# Patient Record
Sex: FEMALE | Race: WHITE | HISPANIC OR LATINO | Employment: FULL TIME | URBAN - METROPOLITAN AREA
[De-identification: names, ages, dates, MRNs, and addresses within clinical notes are randomized per-mention and may not be internally consistent; named-entity substitution may affect disease eponyms.]

---

## 2017-03-22 ENCOUNTER — NON-PROVIDER VISIT (OUTPATIENT)
Dept: OBGYN | Facility: CLINIC | Age: 42
End: 2017-03-22
Payer: MEDICAID

## 2017-03-22 DIAGNOSIS — Z32.01 POSITIVE PREGNANCY TEST: ICD-10-CM

## 2017-03-22 LAB
INT CON NEG: NEGATIVE
INT CON POS: POSITIVE
POC URINE PREGNANCY TEST: POSITIVE

## 2017-03-22 PROCEDURE — 81025 URINE PREGNANCY TEST: CPT | Performed by: NURSE PRACTITIONER

## 2017-04-30 ENCOUNTER — HOSPITAL ENCOUNTER (EMERGENCY)
Facility: MEDICAL CENTER | Age: 42
End: 2017-04-30
Attending: EMERGENCY MEDICINE
Payer: MEDICAID

## 2017-04-30 ENCOUNTER — APPOINTMENT (OUTPATIENT)
Dept: RADIOLOGY | Facility: MEDICAL CENTER | Age: 42
End: 2017-04-30
Attending: EMERGENCY MEDICINE
Payer: MEDICAID

## 2017-04-30 VITALS
DIASTOLIC BLOOD PRESSURE: 54 MMHG | HEART RATE: 65 BPM | RESPIRATION RATE: 18 BRPM | TEMPERATURE: 98.3 F | WEIGHT: 152.12 LBS | HEIGHT: 61 IN | OXYGEN SATURATION: 96 % | BODY MASS INDEX: 28.72 KG/M2 | SYSTOLIC BLOOD PRESSURE: 92 MMHG

## 2017-04-30 DIAGNOSIS — O20.0 THREATENED MISCARRIAGE IN EARLY PREGNANCY: Primary | ICD-10-CM

## 2017-04-30 DIAGNOSIS — N39.0 URINARY TRACT INFECTION WITHOUT HEMATURIA, SITE UNSPECIFIED: ICD-10-CM

## 2017-04-30 LAB
ANION GAP SERPL CALC-SCNC: 9 MMOL/L (ref 0–11.9)
APPEARANCE UR: CLEAR
B-HCG SERPL-ACNC: ABNORMAL MIU/ML (ref 0–5)
BACTERIA #/AREA URNS HPF: ABNORMAL /HPF
BASOPHILS # BLD AUTO: 0.3 % (ref 0–1.8)
BASOPHILS # BLD: 0.02 K/UL (ref 0–0.12)
BILIRUB UR QL STRIP.AUTO: NEGATIVE
BUN SERPL-MCNC: 14 MG/DL (ref 8–22)
CALCIUM SERPL-MCNC: 9 MG/DL (ref 8.5–10.5)
CHLORIDE SERPL-SCNC: 105 MMOL/L (ref 96–112)
CO2 SERPL-SCNC: 23 MMOL/L (ref 20–33)
COLOR UR: YELLOW
CREAT SERPL-MCNC: 0.55 MG/DL (ref 0.5–1.4)
EOSINOPHIL # BLD AUTO: 0.02 K/UL (ref 0–0.51)
EOSINOPHIL NFR BLD: 0.3 % (ref 0–6.9)
EPI CELLS #/AREA URNS HPF: ABNORMAL /HPF
ERYTHROCYTE [DISTWIDTH] IN BLOOD BY AUTOMATED COUNT: 38.3 FL (ref 35.9–50)
GFR SERPL CREATININE-BSD FRML MDRD: >60 ML/MIN/1.73 M 2
GLUCOSE SERPL-MCNC: 114 MG/DL (ref 65–99)
GLUCOSE UR STRIP.AUTO-MCNC: NEGATIVE MG/DL
HCT VFR BLD AUTO: 43.3 % (ref 37–47)
HGB BLD-MCNC: 14.7 G/DL (ref 12–16)
IMM GRANULOCYTES # BLD AUTO: 0.01 K/UL (ref 0–0.11)
IMM GRANULOCYTES NFR BLD AUTO: 0.1 % (ref 0–0.9)
KETONES UR STRIP.AUTO-MCNC: NEGATIVE MG/DL
LEUKOCYTE ESTERASE UR QL STRIP.AUTO: ABNORMAL
LYMPHOCYTES # BLD AUTO: 1.4 K/UL (ref 1–4.8)
LYMPHOCYTES NFR BLD: 20.2 % (ref 22–41)
MCH RBC QN AUTO: 28.6 PG (ref 27–33)
MCHC RBC AUTO-ENTMCNC: 33.9 G/DL (ref 33.6–35)
MCV RBC AUTO: 84.2 FL (ref 81.4–97.8)
MICRO URNS: ABNORMAL
MONOCYTES # BLD AUTO: 0.63 K/UL (ref 0–0.85)
MONOCYTES NFR BLD AUTO: 9.1 % (ref 0–13.4)
MUCOUS THREADS #/AREA URNS HPF: ABNORMAL /HPF
NEUTROPHILS # BLD AUTO: 4.84 K/UL (ref 2–7.15)
NEUTROPHILS NFR BLD: 70 % (ref 44–72)
NITRITE UR QL STRIP.AUTO: NEGATIVE
NRBC # BLD AUTO: 0 K/UL
NRBC BLD AUTO-RTO: 0 /100 WBC
NUMBER OF RH DOSES IND 8505RD: NORMAL
PH UR STRIP.AUTO: 7 [PH]
PLATELET # BLD AUTO: 270 K/UL (ref 164–446)
PMV BLD AUTO: 9.8 FL (ref 9–12.9)
POTASSIUM SERPL-SCNC: 3.5 MMOL/L (ref 3.6–5.5)
PROT UR QL STRIP: NEGATIVE MG/DL
RBC # BLD AUTO: 5.14 M/UL (ref 4.2–5.4)
RBC # URNS HPF: ABNORMAL /HPF
RBC UR QL AUTO: ABNORMAL
RH BLD: NORMAL
SODIUM SERPL-SCNC: 137 MMOL/L (ref 135–145)
SP GR UR STRIP.AUTO: 1.02
WBC # BLD AUTO: 6.9 K/UL (ref 4.8–10.8)
WBC #/AREA URNS HPF: ABNORMAL /HPF

## 2017-04-30 PROCEDURE — 81001 URINALYSIS AUTO W/SCOPE: CPT

## 2017-04-30 PROCEDURE — 85025 COMPLETE CBC W/AUTO DIFF WBC: CPT

## 2017-04-30 PROCEDURE — 36415 COLL VENOUS BLD VENIPUNCTURE: CPT

## 2017-04-30 PROCEDURE — 84702 CHORIONIC GONADOTROPIN TEST: CPT

## 2017-04-30 PROCEDURE — 99284 EMERGENCY DEPT VISIT MOD MDM: CPT

## 2017-04-30 PROCEDURE — 86901 BLOOD TYPING SEROLOGIC RH(D): CPT

## 2017-04-30 PROCEDURE — 80048 BASIC METABOLIC PNL TOTAL CA: CPT

## 2017-04-30 PROCEDURE — 76817 TRANSVAGINAL US OBSTETRIC: CPT

## 2017-04-30 RX ORDER — NITROFURANTOIN 25; 75 MG/1; MG/1
100 CAPSULE ORAL 2 TIMES DAILY
Qty: 20 CAP | Refills: 0 | Status: SHIPPED | OUTPATIENT
Start: 2017-04-30 | End: 2021-04-01

## 2017-04-30 ASSESSMENT — PAIN SCALES - GENERAL: PAINLEVEL_OUTOF10: 0

## 2017-04-30 NOTE — ED AVS SNAPSHOT
Fluential Access Code: [email or web address]  Expires: 5/30/2017 12:04 PM    Your email address is not on file at [facility].  Email Addresses are required for you to sign up for Fluential, please contact [phone] to verify your personal information and to provide your email address prior to attempting to register for Fluential.    Batool Posey  [address]  [place] [address]    Arbsourcet  A secure, online tool to manage your health information     [facility]’s Fluential® is a secure, online tool that connects you to your personalized health information from the privacy of your home -- day or night - making it very easy for you to manage your healthcare. Once the activation process is completed, you can even access your medical information using the Fluential michael, which is available for free in the Apple Michael store or Google Play store.     To learn more about Fluential, visit [email or web address]    There are two levels of access available (as shown below):   My Chart Features  [facility] Primary Care Doctor [facility]  Specialists [facility]  Urgent  Care Non-[facility] Primary Care Doctor   Email your healthcare team securely and privately 24/7 X X X    Manage appointments: schedule your next appointment; view details of past/upcoming appointments X      Request prescription refills. X      View recent personal medical records, including lab and immunizations X X X X   View health record, including health history, allergies, medications X X X X   Read reports about your outpatient visits, procedures, consult and ER notes X X X X   See your discharge summary, which is a recap of your hospital and/or ER visit that includes your diagnosis, lab results, and care plan X X  X     How to register for Arbsourcet:  Once your e-mail address has been verified, follow the following steps to sign up for Arbsourcet.     1. Go to  [email or web address]  2. Click on the Sign Up Now box, which takes you to the New Member Sign Up page. You will need  to provide the following information:  a. Enter your Col[employer]ris Access Code exactly as it appears at the top of this page. (You will not need to use this code after you’ve completed the sign-up process. If you do not sign up before the expiration date, you must request a new code.)   b. Enter your date of birth.   c. Enter your home email address.   d. Click Submit, and follow the next screen’s instructions.  3. Create a Colatris ID. This will be your Colatris login ID and cannot be changed, so think of one that is secure and easy to remember.  4. Create a Colatris password. You can change your password at any time.  5. Enter your Password Reset Question and Answer. This can be used at a later time if you forget your password.   6. Enter your e-mail address. This allows you to receive e-mail notifications when new information is available in Colatris.  7. Click Sign Up. You can now view your health information.    For assistance activating your Colatris account, call [phone]

## 2017-04-30 NOTE — ED AVS SNAPSHOT
4/30/2017    Batool Posey  [address]  [place] [address]    Dear Batool:    [facility] hospital wants to ensure your discharge home is safe and you or your loved ones have had all of your questions answered regarding your care after you leave the hospital.    Below is a list of resources and contact information should you have any questions regarding your hospital stay, follow-up instructions, or active medical symptoms.    Questions or Concerns Regarding… Contact   Medical Questions Related to Your Discharge  (7 days a week, 8am-5pm) Contact a Nurse Care Coordinator   [phone]   Medical Questions Not Related to Your Discharge  (24 hours a day / 7 days a week)  Contact the Nurse Health Line   [phone]    Medications or Discharge Instructions Refer to your discharge packet   or contact your [facility] Primary Care Provider   [phone]   Follow-up Appointment(s) Schedule your appointment via Dealstruck   or contact Scheduling [phone]   Billing Review your statement via Dealstruck  or contact Billing [phone]   Medical Records Review your records via Dealstruck   or contact Medical Records [phone]     You may receive a telephone call within two days of discharge. This call is to make certain you understand your discharge instructions and have the opportunity to have any questions answered. You can also easily access your medical information, test results and upcoming appointments via the Dealstruck free online health management tool. You can learn more and sign up at [email or web address]/Dealstruck. For assistance setting up your Dealstruck account, please call [phone].    Once again, we want to ensure your discharge home is safe and that you have a clear understanding of any next steps in your care. If you have any questions or concerns, please do not hesitate to contact us, we are here for you. Thank you for choosing [facility] for your healthcare needs.    Sincerely,    Your [facility] Healthcare Team

## 2017-04-30 NOTE — ED PROVIDER NOTES
ED Provider Note    Scribed for Arpan Marrero M.D. by Celina Romero. 2017  9:23 AM    Primary Care Provider: Ryan Cook M.D.  Means of arrival: Walk in   History obtained by: Patient's Daughter  History limited by: None    CHIEF COMPLAINT  Chief Complaint   Patient presents with   • Vaginal Bleeding       HPI  Batool Posey is a 41 y.o. female who presents to the ED complaining of vaginal bleeding with an onset of 1 day. Patient is approximately 10 weeks gravid. Symptoms developed yesterday with vaginal bleeding primarily only when wiping.  She denies fevers, vomiting, abdominal pain, back pain or dysuria.  History of ; last menstrual period was 17.    Daughter is interpreting for her.    REVIEW OF SYSTEMS  CONSTITUTIONAL:  Denies fever, chills, weight gain/loss, or weakness.  EYES:  Denies photophobia   ENT:  Denies sore throat, nose, or ear pain.  CARDIOVASCULAR:  Denies chest pain, palpitations, or swelling.  RESPIRATORY:  Denies cough, shortness of breath, difficulty breathing.  GI:  Denies abdominal pain,  vomiting, or diarrhea.  : Positive vaginal bleeding.  Denies dysuria.  MUSCULOSKELETAL:  Denies back pain.  SKIN:  No rash or bruising.  NEUROLOGIC:  Denies headache, focal weakness, or numbness.  PSYCHIATRIC:  Denies depression.    See HPI for further details.  C.      PAST MEDICAL HISTORY  Patient denies a history of diabetes or hypertension.  History of ; last menstrual period was 17.      FAMILY HISTORY  History reviewed. No pertinent family history.      SOCIAL HISTORY  Patient reports that she has never smoked. She reports that she does not drink alcohol or use illicit drugs.      SURGICAL HISTORY  History reviewed. No pertinent past surgical history.      CURRENT MEDICATIONS  Home Medications     Reviewed by Ramila Mohamud R.N. (Registered Nurse) on 17 at 0952  Med List Status: Complete    Medication Last Dose Status          Patient Ruben Taking any Medications    "                     ALLERGIES  None      PHYSICAL EXAM  VITAL SIGNS: /58 mmHg  Pulse 81  Temp(Src) 36.8 °C (98.3 °F)  Resp 16  Ht 1.549 m (5' 1\")  Wt 69 kg (152 lb 1.9 oz)  BMI 28.76 kg/m2  SpO2 96%       Constitutional: Patient is awake and alert. No acute respiratory distress. Well developed, Well nourished,  HENT: Normocephalic, Atraumatic, Bilateral external ears normal, Oropharynx pink moist with no exudates, Nose patent.  Eyes:  Sclera and conjunctiva clear, No discharge.   Neck:  Anterior is midline, Supple no nuchal rigidity, no thyromegaly or mass. Non-tender  Lymphatic:   Cardiovascular: Heart is regular rate and rhythm no murmur,  Thorax & Lungs: Chest is symmetrical, with good breath sounds. No wheezing or crackles. No respiratory distress,   Abdomen: Soft, No tenderness no hepatosplenomegaly there is no guarding or rebound, No masses, No pulsatile masses.   Pelvic: Female nurse present. Normal external female genitalia. Very small amount of blood.  Cervical os closed.  Skin: Warm, Dry, no petechia, purpura, or rash.   Back: Non tender with palpation, No CVA tenderness.   Extremities: No edema. Non tender.   Musculoskeletal: Good range of motion to upper and lower extremities. No gross deformities noted.   Neurologic: Speech is clear, Alert & oriented to person, time, and place.  Strength is 5 over 5 and symmetric in bilateral upper and lower extremities.  Sensory is intact to light touch to face, arms, and legs.  DTRs are symmetrical in biceps brachioradialis and Achilles.  DTRs are 2+ in patellas.  Psychiatric: Normal affect.      LABS  Results for orders placed or performed during the hospital encounter of 04/30/17   CBC WITH DIFFERENTIAL   Result Value Ref Range    WBC 6.9 4.8 - 10.8 K/uL    RBC 5.14 4.20 - 5.40 M/uL    Hemoglobin 14.7 12.0 - 16.0 g/dL    Hematocrit 43.3 37.0 - 47.0 %    MCV 84.2 81.4 - 97.8 fL    MCH 28.6 27.0 - 33.0 pg    MCHC 33.9 33.6 - 35.0 g/dL    RDW 38.3 35.9 - " 50.0 fL    Platelet Count 270 164 - 446 K/uL    MPV 9.8 9.0 - 12.9 fL    Neutrophils-Polys 70.00 44.00 - 72.00 %    Lymphocytes 20.20 (L) 22.00 - 41.00 %    Monocytes 9.10 0.00 - 13.40 %    Eosinophils 0.30 0.00 - 6.90 %    Basophils 0.30 0.00 - 1.80 %    Immature Granulocytes 0.10 0.00 - 0.90 %    Nucleated RBC 0.00 /100 WBC    Neutrophils (Absolute) 4.84 2.00 - 7.15 K/uL    Lymphs (Absolute) 1.40 1.00 - 4.80 K/uL    Monos (Absolute) 0.63 0.00 - 0.85 K/uL    Eos (Absolute) 0.02 0.00 - 0.51 K/uL    Baso (Absolute) 0.02 0.00 - 0.12 K/uL    Immature Granulocytes (abs) 0.01 0.00 - 0.11 K/uL    NRBC (Absolute) 0.00 K/uL   BASIC METABOLIC PANEL   Result Value Ref Range    Sodium 137 135 - 145 mmol/L    Potassium 3.5 (L) 3.6 - 5.5 mmol/L    Chloride 105 96 - 112 mmol/L    Co2 23 20 - 33 mmol/L    Glucose 114 (H) 65 - 99 mg/dL    Bun 14 8 - 22 mg/dL    Creatinine 0.55 0.50 - 1.40 mg/dL    Calcium 9.0 8.5 - 10.5 mg/dL    Anion Gap 9.0 0.0 - 11.9   HCG QUANTITATIVE SERUM   Result Value Ref Range    Bhcg 92038.8 (H) 0.0 - 5.0 mIU/mL   RH TYPE FOR RHOGAM FROM E.D.   Result Value Ref Range    Emergency Department Rh Typing POS     Number Of Rh Doses Indicated ZERO    URINALYSIS   Result Value Ref Range    Micro Urine Req Microscopic     Color Yellow     Character Clear     Specific Gravity 1.023 <1.035    Ph 7.0 5.0-8.0    Glucose Negative Negative mg/dL    Ketones Negative Negative mg/dL    Protein Negative Negative mg/dL    Bilirubin Negative Negative    Nitrite Negative Negative    Leukocyte Esterase Small (A) Negative    Occult Blood Small (A) Negative   URINE MICROSCOPIC (W/UA)   Result Value Ref Range    WBC 0-2 /hpf    RBC 0-2 /hpf    Bacteria Few (A) None /hpf    Epithelial Cells Few /hpf    Mucous Threads Few /hpf   ESTIMATED GFR   Result Value Ref Range    GFR If African American >60 >60 mL/min/1.73 m 2    GFR If Non African American >60 >60 mL/min/1.73 m 2      All labs reviewed by  "me.      RADIOLOGY/PROCEDURES  US-OB PELVIS TRANSVAGINAL   Final Result      Intrauterine pregnancy with gestational age by this ultrasound of 7 weeks and 3 days. No fetal heart beat is identified.           The radiologist's interpretations of all radiological studies have been reviewed by me.       COURSE & MEDICAL DECISION MAKING  Pertinent Labs & Imaging studies reviewed. (See chart for details)    Differential Diagnosis include but are not limited to: IUP, ectopic pregnancy or UTI.    9:25 AM Patient seen and examined at bedside. Patient presents for vaginal bleeding. Patient is asked to remain NPO at this time.     Initial orders in the Emergency Department included US-OB pelvis transvaginal and laboratory testing: microscopic urine, POC UA, POC urine pregnancy, CBC with differential, BMP, estimated GFR, HCG quantitative serum and Rh type. Patient verbalized their understanding and agreement to this plan.    10:40 AM Pelvic exam was completed at bedside with the assistance of a female nurse.    11:28 AM Spoke with MATTHEW Allred, regarding the patient's presenting symptoms, labs and ultrasound results.  He would life for the patient to follow up in office.    12:00 PM On repeat evaluation, patient is doing well. Lab and ultrasound were discussed as noted above.    Discharge plan was discussed with the patient and includes following up with MATTHEW Allred, in three days.  Patient will be discharged with a prescription for Macrobid.       The patient will return for new or persisting symptoms including vaginal bleeding or abdominal pain.  The patient verbalizes understanding and will comply.  Patient is stable at the time of discharge.  Vital signs were reviewed: BP 92/54 mmHg  Pulse 65  Temp(Src) 36.8 °C (98.3 °F)  Resp 18  Ht 1.549 m (5' 1\")  Wt 69 kg (152 lb 1.9 oz)  BMI 28.76 kg/m2  SpO2 96%     Patient who is pregnant. Vaginal bleeding. Quantitative hCG is 18,000. Ultrasound shows a uterine pregnancy " with gestational age by ultrasound at 7 weeks 3 days. There is no fetal heartbeat. Concern of course would be for fetal demise or threatened miscarriage. Urine was clean. She is Rh+. I discussed case with Dr. Wells and he will see the patient this week in follow-up. This period of time the patient was strict bedrest. Threatened miscarriage information sheet. I explained to the patient and her significant other through the daughter that she needs very close follow-up. That we do not see a heartbeat on the baby. That this may be fetal demise but also threatened miscarriage. Again they understand they will have close follow-up as an outpatient.  Patient does have a urinary tract infection and we have placed her on Macrodantin.    DISPOSITION  Patient will be discharged home in stable condition.      FOLLOW UP  Perez Wells M.D.  [address]  [facility] [address]  [phone]    Schedule an appointment as soon as possible for a visit in 3 days        The patient is referred to a primary physician for blood pressure management, diabetic screening, and for all other preventative health concerns.      OUTPATIENT MEDICATIONS  Discharge Medication List as of 4/30/2017 12:04 PM      START taking these medications    Details   nitrofurantoin monohydr macro (MACROBID) 100 MG Cap Take 1 Cap by mouth 2 times a day., Disp-20 Cap, R-0, Print Rx Paper             DIAGNOSIS  1. Threatened miscarriage in early pregnancy    2. Urinary tract infection without hematuria, site unspecified         PLAN  1. Macrodantin  2. Follow with Dr. Wells call tomorrow for appointment this week  3. Threatened miscarriage information sheet. Strict bedrest. Vaginal rest.  4. Return to the emergency department for increased pains, fevers, vomiting or change in condition.      The note accurately reflects work and decisions made by me.  Arpan Marrero  4/30/2017  2:18 PM     I, Celina Romero (Scribe), am scribing for, and in the presence  ofArpan M.D.    Electronically signed by: Celina Romero (Scribe), 4/30/2017    I, Arpan Marrero M.D. personally performed the services described in this documentation, as scribed by Celina Romero in my presence, and it is both accurate and complete.

## 2017-04-30 NOTE — DISCHARGE INSTRUCTIONS
Please follow up with a primary physician for blood pressure management, diabetic screening, and all other preventive health concerns.      Amenaza de aborto  (Threatened Miscarriage)  La amenaza de aborto se produce cuando hay hemorragia vaginal haley las primeras 20 semanas de embarazo, randi el embarazo no se interrumpe. El médico le hará pruebas para asegurarse de que el embarazo continúe. La causa de la hemorragia puede ser desconocida. Lana trastorno no significa que el embarazo terminará. Sin embargo, aumenta el riesgo de que el embarazo se interrumpa (aborto completo).  CUIDADOS EN EL HOGAR   · Asegúrese de asistir a todas las citas de cuidados prenatales con el médico.  · Descanse lo suficiente.  · No tenga relaciones sexuales ni use tampones si tiene hemorragia vaginal.  · No se anil duchas vaginales.  · No fume ni consuma drogas.  · No wiliam alcohol.  · Evite la cafeína.  SOLICITE AYUDA SI:  · Tiene richard hemorragia leve de la vagina.  · Tiene dolor o cólicos abdominales.  · Tiene fiebre.  SOLICITE AYUDA DE INMEDIATO SI:   · Tiene hemorragia abundante de la vagina.  · Elimina coágulos de galileo por la vagina.  · Tiene mucho dolor en el abdomen o la parte baja de la espalda, cólicos abdominales o calambres en la parte baja de la espalda.  · Tiene fiebre, escalofríos y mucho dolor abdominal.  ASEGÚRESE DE QUE:   · Comprende estas instrucciones.  · Controlará brandt afección.  · Recibirá ayuda de inmediato si no mejora o si empeora.     Esta información no tiene meseret fin reemplazar el consejo del médico. Asegúrese de hacerle al médico cualquier pregunta que tenga.     Document Released: 01/20/2012 Document Revised: 12/23/2014  Elsevier Interactive Patient Education ©2016 Elsevier Inc.    Infección urinaria   (Urinary Tract Infection)   La infección urinaria puede ocurrir en cualquier lugar del tracto urinario. El tracto urinario es un sistema de drenaje del cuerpo por el que se eliminan los desechos y el exceso de  agua. El tracto urinario está formado por dos riñones, dos uréteres, la vejiga y la uretra. Los riñones son órganos que tienen forma de frijol. Cada riñón tiene aproximadamente el tamaño del puño. Están situados debajo de las costillas, megha a cada lado de la columna vertebral  CAUSAS   La causa de la infección son los microbios, que son organismos microscópicos, que incluyen hongos, virus, y bacterias. Estos organismos son tan pequeños que sólo pueden verse a través del microscopio. Las bacterias son los microorganismos que más comúnmente causan infecciones urinarias.   SÍNTOMAS   Los síntomas pueden variar según la edad y el sexo del paciente y por la ubicación de la infección. Los síntomas en las mujeres jóvenes incluyen la necesidad frecuente e intensa de orinar y richard sensación dolorosa de ardor en la vejiga o en la uretra haley la micción. Las mujeres y los hombres mayores podrán sentir cansancio, temblores y debilidad y sentir annmarie musculares y dolor abdominal. Si tiene fiebre, puede significar que la infección está en los riñones. Otros síntomas son dolor en la espalda o en los lados debajo de las costillas, náuseas y vómitos.   DIAGNÓSTICO   Para diagnosticar richard infección urinaria, el médico le preguntará acerca de marcella síntomas. También le solicitará richard muestra de orina. La muestra de orina se analiza para detectar bacterias y glóbulos blancos de la galileo. Los glóbulos blancos se sue en el organismo para ayudar a combatir las infecciones.   TRATAMIENTO   Por lo general, las infecciones urinarias pueden tratarse con medicamentos. Debido a que la mayoría de las infecciones son causadas por bacterias, por lo general pueden tratarse con antibióticos. La elección del antibiótico y la duración del tratamiento dependerá de marcella síntomas y el tipo de bacteria causante de la infección.   INSTRUCCIONES PARA EL CUIDADO EN EL HOGAR   · Si le recetaron antibióticos, tómelos exactamente meseret brandt médico le indique.  Termine el medicamento aunque se sienta mejor después de nidia tomado sólo algunos.  · Ebony gran cantidad de líquido para mantener la orina de keyla derrick o color amarillo pálido.  · Evite la cafeína, el té y las bebidas gaseosas. Estas sustancias irritan la vejiga.  · Vaciar la vejiga con frecuencia. Evite retener la orina haley largos períodos.  · Vacíe la vejiga antes y después de tener relaciones sexuales.  · Después de  el intestino, las mujeres deben higienizarse la región perineal desde adelante hacia atrás. Use sólo un papel tissue por vez.  SOLICITE ATENCIÓN MÉDICA SI:   · Siente dolor en la espalda.  · Le sube la fiebre.  · Los síntomas no mejoran luego de 3 días.  SOLICITE ATENCIÓN MÉDICA DE INMEDIATO SI:   · Siente dolor intenso en la espalda o en la zelda inferior del abdomen.  · Comienza a sentir escalofríos.  · Tiene náuseas o vómitos.  · Tiene richard sensación continua de quemazón o molestias al orinar.  ASEGÚRESE DE QUE:   · Comprende estas instrucciones.  · Controlará brandt enfermedad.  · Solicitará ayuda de inmediato si no mejora o empeora.     Esta información no tiene meseret fin reemplazar el consejo del médico. Asegúrese de hacerle al médico cualquier pregunta que tenga.     Document Released: 09/27/2006 Document Revised: 09/11/2013  Elsevier Interactive Patient Education ©2016 Elsevier Inc.

## 2017-04-30 NOTE — ED AVS SNAPSHOT
Home Care Instructions                                                                                                                Batool Posey   MRN: [id number]    Department:  [facility], Emergency Dept   Date of Visit:  4/30/2017            [facility], Emergency Dept    [address]    [place] [address]    Phone:  [phone]      You were seen by     Arpan Marrero M.D.      Your Diagnosis Was     Threatened miscarriage in early pregnancy     O20.0 Possible fetal demise      Follow-up Information     1. Follow up with Perez Wells M.D.. Schedule an appointment as soon as possible for a visit in 3 days.    Specialty:  OB/Gyn    Contact information    [address]  [place] [address]  [phone]        Medication Information     Review all of your home medications and newly ordered medications with your primary doctor and/or pharmacist as soon as possible. Follow medication instructions as directed by your doctor and/or pharmacist.     Please keep your complete medication list with you and share with your physician. Update the information when medications are discontinued, doses are changed, or new medications (including over-the-counter products) are added; and carry medication information at all times in the event of emergency situations.               Medication List      START taking these medications        Instructions    Morning Afternoon Evening Bedtime    nitrofurantoin monohydr macro 100 MG Caps   Commonly known as:  MACROBID        Take 1 Cap by mouth 2 times a day.   Dose:  100 mg                             Where to Get Your Medications      You can get these medications from any pharmacy     Bring a paper prescription for each of these medications    - nitrofurantoin monohydr macro 100 MG Caps            Procedures and tests performed during your visit     BASIC METABOLIC PANEL    CBC WITH DIFFERENTIAL    ESTIMATED GFR    HCG QUANTITATIVE  SERUM    RH TYPE FOR RHOGAM FROM E.D.    Set Up for Pelvic Exam    URINALYSIS    URINE MICROSCOPIC (W/UA)    US-OB PELVIS TRANSVAGINAL        Discharge Instructions       Amenaza de aborto  (Threatened Miscarriage)  La amenaza de aborto se produce cuando hay hemorragia vaginal haley las primeras 20 semanas de embarazo, randi el embarazo no se interrumpe. El médico le hará pruebas para asegurarse de que el embarazo continúe. La causa de la hemorragia puede ser desconocida. Lana trastorno no significa que el embarazo terminará. Sin embargo, aumenta el riesgo de que el embarazo se interrumpa (aborto completo).  CUIDADOS EN EL HOGAR   · Asegúrese de asistir a todas las citas de cuidados prenatales con el médico.  · Descanse lo suficiente.  · No tenga relaciones sexuales ni use tampones si tiene hemorragia vaginal.  · No se anil duchas vaginales.  · No fume ni consuma drogas.  · No wiliam alcohol.  · Evite la cafeína.  SOLICITE AYUDA SI:  · Tiene richard hemorragia leve de la vagina.  · Tiene dolor o cólicos abdominales.  · Tiene fiebre.  SOLICITE AYUDA DE INMEDIATO SI:   · Tiene hemorragia abundante de la vagina.  · Elimina coágulos de galileo por la vagina.  · Tiene mucho dolor en el abdomen o la parte baja de la espalda, cólicos abdominales o calambres en la parte baja de la espalda.  · Tiene fiebre, escalofríos y mucho dolor abdominal.  ASEGÚRESE DE QUE:   · Comprende estas instrucciones.  · Controlará brandt afección.  · Recibirá ayuda de inmediato si no mejora o si empeora.     Esta información no tiene meseret fin reemplazar el consejo del médico. Asegúrese de hacerle al médico cualquier pregunta que tenga.     Document Released: 01/20/2012 Document Revised: 12/23/2014  Elsevier Interactive Patient Education ©2016 [employer]vier Inc.    Infección urinaria   (Urinary Tract Infection)   La infección urinaria puede ocurrir en cualquier lugar del tracto urinario. El tracto urinario es un sistema de drenaje del cuerpo por el que se  eliminan los desechos y el exceso de agua. El tracto urinario está formado por dos riñones, dos uréteres, la vejiga y la uretra. Los riñones son órganos que tienen forma de frijol. Cada riñón tiene aproximadamente el tamaño del puño. Están situados debajo de las costillas, megha a cada lado de la columna vertebral  CAUSAS   La causa de la infección son los microbios, que son organismos microscópicos, que incluyen hongos, virus, y bacterias. Estos organismos son tan pequeños que sólo pueden verse a través del microscopio. Las bacterias son los microorganismos que más comúnmente causan infecciones urinarias.   SÍNTOMAS   Los síntomas pueden variar según la edad y el sexo del paciente y por la ubicación de la infección. Los síntomas en las mujeres jóvenes incluyen la necesidad frecuente e intensa de orinar y richard sensación dolorosa de ardor en la vejiga o en la uretra haley la micción. Las mujeres y los hombres mayores podrán sentir cansancio, temblores y debilidad y sentir annmarie musculares y dolor abdominal. Si tiene fiebre, puede significar que la infección está en los riñones. Otros síntomas son dolor en la espalda o en los lados debajo de las costillas, náuseas y vómitos.   DIAGNÓSTICO   Para diagnosticar richard infección urinaria, el médico le preguntará acerca de marcella síntomas. También le solicitará richard muestra de orina. La muestra de orina se analiza para detectar bacterias y glóbulos blancos de la galileo. Los glóbulos blancos se sue en el organismo para ayudar a combatir las infecciones.   TRATAMIENTO   Por lo general, las infecciones urinarias pueden tratarse con medicamentos. Debido a que la mayoría de las infecciones son causadas por bacterias, por lo general pueden tratarse con antibióticos. La elección del antibiótico y la duración del tratamiento dependerá de marcella síntomas y el tipo de bacteria causante de la infección.   INSTRUCCIONES PARA EL CUIDADO EN EL HOGAR   · Si le recetaron antibióticos, tómelos  exactamente meseret brandt médico le indique. Termine el medicamento aunque se sienta mejor después de nidia tomado sólo algunos.  · Ebony gran cantidad de líquido para mantener la orina de keyla derrick o color amarillo pálido.  · Evite la cafeína, el té y las bebidas gaseosas. Estas sustancias irritan la vejiga.  · Vaciar la vejiga con frecuencia. Evite retener la orina haley largos períodos.  · Vacíe la vejiga antes y después de tener relaciones sexuales.  · Después de  el intestino, las mujeres deben higienizarse la región perineal desde adelante hacia atrás. Use sólo un papel tissue por vez.  SOLICITE ATENCIÓN MÉDICA SI:   · Siente dolor en la espalda.  · Le sube la fiebre.  · Los síntomas no mejoran luego de 3 días.  SOLICITE ATENCIÓN MÉDICA DE INMEDIATO SI:   · Siente dolor intenso en la espalda o en la zelda inferior del abdomen.  · Comienza a sentir escalofríos.  · Tiene náuseas o vómitos.  · Tiene richard sensación continua de quemazón o molestias al orinar.  ASEGÚRESE DE QUE:   · Comprende estas instrucciones.  · Controlará brandt enfermedad.  · Solicitará ayuda de inmediato si no mejora o empeora.     Esta información no tiene meseret fin reemplazar el consejo del médico. Asegúrese de hacerle al médico cualquier pregunta que tenga.     Document Released: 09/27/2006 Document Revised: 09/11/2013  Elsevier Interactive Patient Education ©2016 [employer]            Patient Information     Patient Information    Following emergency treatment: all patient requiring follow-up care must return either to a private physician or a clinic if your condition worsens before you are able to obtain further medical attention, please return to the emergency room.     Billing Information    At [facility], we work to make the billing process streamlined for our patients.  Our Representatives are here to answer any questions you may have regarding your hospital bill.  If you have insurance coverage and have supplied your insurance  information to us, we will submit a claim to your insurer on your behalf.  Should you have any questions regarding your bill, we can be reached online or by phone as follows:  Online: You are able pay your bills online or live chat with our representatives about any billing questions you may have. We are here to help Monday - Friday from 8:00am to 7:30pm and 9:00am - 12:00pm on Saturdays.  Please visit [email or web address]  for more information.   Phone:  [phone] or [phone]    Please note that your emergency physician, surgeon, pathologist, radiologist, anesthesiologist, and other specialists are not employed by [facility] and will therefore bill separately for their services.  Please contact them directly for any questions concerning their bills at the numbers below:     Emergency Physician Services:  [phone]  [facility]:  [phone]  [facility]:  [phone]  [facility]:  [phone]    1. Your final bill may vary from the amount quoted upon discharge if all procedures are not complete at that time, or if your doctor has additional procedures of which we are not aware. You will receive an additional bill if you return to the Emergency Department at [facility] for suture removal regardless of the facility of which the sutures were placed.     2. Please arrange for settlement of this account at the emergency registration.    3. All self-pay accounts are due in full at the time of treatment.  If you are unable to meet this obligation then payment is expected within 4-5 days.     4. If you have had radiology studies (CT, X-ray, Ultrasound, MRI), you have received a preliminary result during your emergency department visit. Please contact the radiology department [phone] to receive a copy of your final result. Please discuss the Final result with your primary physician or with the follow up physician  provided.     Crisis Hotline:  [place] Crisis Hotline:  9-704-ELGZCVD or 1-370.999.1525  [place] Crisis Hotline:    1-950.728.3608 or 500-684-6518         ED Discharge Follow Up Questions    1. In order to provide you with very good care, we would like to follow up with a phone call in the next few days.  May we have your permission to contact you?     YES /  NO    2. What is the best phone number to call you? (       )_____-__________    3. What is the best time to call you?      Morning  /  Afternoon  /  Evening                   Patient Signature:  ____________________________________________________________    Date:  ____________________________________________________________      Your appointments     May 04, 2017 12:30 PM   New OB Exam with PC INTAKE, NEW OB   [facility]    [address] [place] [address]  [place] [address] [phone]

## 2017-05-01 ENCOUNTER — PATIENT OUTREACH (OUTPATIENT)
Dept: HEALTH INFORMATION MANAGEMENT | Facility: OTHER | Age: 42
End: 2017-05-01

## 2017-05-01 NOTE — PROGRESS NOTES
· 5/1/17 at 11:15 AM--Received phone call from pt's daughter.  Pt's daughter states pt was seen in ER 4/30/17.  Pt was instructed to call Dr. Perez Wells for f/u appt.  Pt's daughter states that she placed phone call to Dr. Wells's office to schedule f/u and was told that Dr. Wells was not taking any new pts.  Pt's daughter states that she [facility] and was told that pt could not be seen there d/t no detected fetal heartbeat in ER.  This writer gave pt's daughter contact information for [facility] clinic on [address] with phone number and instructed her to attempt to have pt seen at [facility] clinic.  Pt's daughter verbalizes understanding.

## 2019-07-03 ENCOUNTER — OFFICE VISIT (OUTPATIENT)
Dept: URGENT CARE | Facility: PHYSICIAN GROUP | Age: 44
End: 2019-07-03

## 2019-07-03 VITALS
DIASTOLIC BLOOD PRESSURE: 62 MMHG | SYSTOLIC BLOOD PRESSURE: 100 MMHG | HEART RATE: 84 BPM | RESPIRATION RATE: 16 BRPM | WEIGHT: 141 LBS | OXYGEN SATURATION: 95 % | BODY MASS INDEX: 26.64 KG/M2 | TEMPERATURE: 98.9 F

## 2019-07-03 DIAGNOSIS — K52.9 GASTROENTERITIS: ICD-10-CM

## 2019-07-03 PROCEDURE — 99204 OFFICE O/P NEW MOD 45 MIN: CPT | Performed by: PHYSICIAN ASSISTANT

## 2019-07-03 RX ORDER — ONDANSETRON 4 MG/1
4 TABLET, FILM COATED ORAL EVERY 6 HOURS PRN
Qty: 20 TAB | Refills: 1 | Status: SHIPPED | OUTPATIENT
Start: 2019-07-03 | End: 2021-04-01

## 2019-07-03 RX ORDER — LOPERAMIDE HYDROCHLORIDE 2 MG/1
2 CAPSULE ORAL 4 TIMES DAILY PRN
Qty: 30 CAP | Refills: 1 | Status: SHIPPED | OUTPATIENT
Start: 2019-07-03 | End: 2021-04-01

## 2019-07-03 ASSESSMENT — ENCOUNTER SYMPTOMS
NAUSEA: 1
DIARRHEA: 1
ABDOMINAL PAIN: 0
VOMITING: 1
NUMBER OF EPISODES OF EMESIS TODAY: 1
CONSTIPATION: 0
BACK PAIN: 0
BLOOD IN STOOL: 0
CHILLS: 0
FLANK PAIN: 0
SHORTNESS OF BREATH: 0
FEVER: 0
MYALGIAS: 0

## 2019-07-03 NOTE — PROGRESS NOTES
Subjective:     Batool Posey is a 43 y.o. female who presents for Emesis (D/V x 2 days)       Patient seen to the urgent care with the last 36 hours of nausea vomiting and diarrhea.  She reports 2-3 episodes of diarrhea and a single episode of emesis this morning.  She notes she did eat some food she was concerned as bad the day before onset of symptoms.  She denies fever but complains of body aches.  She denies abdominal pain.  She denies history of abdominal surgeries.  She reports normal recent menses.  She denies dysuria frequency urgency or any abnormalities of urine.  She denies blood per stool or melena.  She suspects she is ingested bad food.  Last episode with emesis was early this morning around 5 AM.      Emesis   This is a new problem. The current episode started in the past 7 days. Associated symptoms include nausea and vomiting. Pertinent negatives include no abdominal pain, chills, fever, myalgias or rash.   No past medical history on file.No past surgical history on file.  Social History     Social History   • Marital status:      Spouse name: N/A   • Number of children: N/A   • Years of education: N/A     Occupational History   • Not on file.     Social History Main Topics   • Smoking status: Never Smoker   • Smokeless tobacco: Not on file   • Alcohol use No   • Drug use: No   • Sexual activity: Not on file     Other Topics Concern   • Not on file     Social History Narrative   • No narrative on file    No family history on file. Review of Systems   Constitutional: Negative for chills and fever.   Respiratory: Negative for shortness of breath.    Gastrointestinal: Positive for diarrhea, nausea and vomiting. Negative for abdominal pain, blood in stool, constipation and melena.   Genitourinary: Negative for dysuria, flank pain, frequency, hematuria and urgency.   Musculoskeletal: Negative for back pain and myalgias.   Skin: Negative for rash.   No Known Allergies   Objective:   /62   Pulse  84   Temp 37.2 °C (98.9 °F) (Temporal)   Resp 16   Wt 64 kg (141 lb)   LMP 06/27/2019 (Approximate)   SpO2 95%   Breastfeeding? Unknown   BMI 26.64 kg/m²   Physical Exam   Constitutional: She is oriented to person, place, and time. She appears well-developed and well-nourished. No distress.   HENT:   Head: Normocephalic and atraumatic.   Right Ear: External ear normal.   Left Ear: External ear normal.   Nose: Nose normal.   Mouth/Throat: Uvula is midline, oropharynx is clear and moist and mucous membranes are normal.   Eyes: Conjunctivae and lids are normal. Right eye exhibits no discharge. Left eye exhibits no discharge. No scleral icterus.   Neck: Neck supple.   Pulmonary/Chest: Effort normal. No respiratory distress.   Abdominal: Soft. Normal appearance. Bowel sounds are increased. There is no tenderness. There is no rigidity, no rebound, no guarding, no CVA tenderness, no tenderness at McBurney's point and negative Hsu's sign.   Musculoskeletal: Normal range of motion.   Lymphadenopathy:     She has no cervical adenopathy.   Neurological: She is alert and oriented to person, place, and time. She is not disoriented.   Skin: Skin is warm and dry. She is not diaphoretic. No erythema. No pallor.   Psychiatric: Her speech is normal and behavior is normal.   Nursing note and vitals reviewed.        Assessment/Plan:   Assessment    1. Gastroenteritis  - ondansetron (ZOFRAN) 4 MG Tab tablet; Take 1 Tab by mouth every 6 hours as needed for Nausea/Vomiting.  Dispense: 20 Tab; Refill: 1  - loperamide (IMODIUM) 2 MG Cap; Take 1 Cap by mouth 4 times a day as needed for Diarrhea.  Dispense: 30 Cap; Refill: 1  Supportive care is reviewed with patient/caregiver - recommend to push PO fluids and electrolytes, zofran imodium, fluid resuscitation, brat diet, nature and duration of gastroenteritis discussed with patient sent with work excuse note and we reviewed red flag symptoms to return to clinic    Return to clinic  with lack of resolution or progression of symptoms.  ER precautions with any worsening symptoms are reviewed with patient/caregiver and they do express understanding    Differential diagnosis, natural history, supportive care, and indications for immediate follow-up discussed.

## 2019-07-03 NOTE — LETTER
July 3, 2019       Patient: Batool Posey   YOB: 1975   Date of Visit: 7/3/2019         To Whom It May Concern:    It is my medical opinion that Batool Posey should be excused from work for today and tomorrow due to illness.       If you have any questions or concerns, please don't hesitate to call [phone]          Sincerely,          Wade Jernigan P.A.-C.  Electronically Signed

## 2021-04-01 ENCOUNTER — OFFICE VISIT (OUTPATIENT)
Dept: URGENT CARE | Facility: PHYSICIAN GROUP | Age: 46
End: 2021-04-01

## 2021-04-01 VITALS
HEIGHT: 63 IN | BODY MASS INDEX: 24.8 KG/M2 | WEIGHT: 140 LBS | SYSTOLIC BLOOD PRESSURE: 136 MMHG | HEART RATE: 74 BPM | RESPIRATION RATE: 18 BRPM | OXYGEN SATURATION: 98 % | TEMPERATURE: 99.3 F | DIASTOLIC BLOOD PRESSURE: 76 MMHG

## 2021-04-01 DIAGNOSIS — R14.0 ABDOMINAL DISTENSION: ICD-10-CM

## 2021-04-01 DIAGNOSIS — R10.11 RUQ ABDOMINAL PAIN: ICD-10-CM

## 2021-04-01 PROCEDURE — 99214 OFFICE O/P EST MOD 30 MIN: CPT | Performed by: NURSE PRACTITIONER

## 2021-04-01 ASSESSMENT — ENCOUNTER SYMPTOMS
FEVER: 0
BLOOD IN STOOL: 0
DIZZINESS: 0
DIARRHEA: 0
VOMITING: 0
NAUSEA: 0
HEADACHES: 0
CONSTIPATION: 0
BRUISES/BLEEDS EASILY: 0
COUGH: 0
ABDOMINAL PAIN: 1

## 2021-04-01 ASSESSMENT — LIFESTYLE VARIABLES: SUBSTANCE_ABUSE: 0

## 2021-04-02 ENCOUNTER — HOSPITAL ENCOUNTER (OUTPATIENT)
Dept: RADIOLOGY | Facility: MEDICAL CENTER | Age: 46
End: 2021-04-02
Attending: NURSE PRACTITIONER
Payer: COMMERCIAL

## 2021-04-02 DIAGNOSIS — R10.11 RUQ ABDOMINAL PAIN: ICD-10-CM

## 2021-04-02 PROCEDURE — 76705 ECHO EXAM OF ABDOMEN: CPT

## 2021-04-02 NOTE — PROGRESS NOTES
"Batool Posey is a 45 y.o. female who presents for Bloated (x 5 days. abdominal pain. feels bloated after everything she eats. she has discomfort on the right side of her ribs. )      HPI This is a new problem.  Batool is a 44 y/o female with c/o 5 days of feeling bloated with pain under the right side of her ribs. Hurts worse after eating food. Pain is always there. Feeling abd distention. Mild intermittent nausea. Pain is sharp to dull. Moderate pain.  Treatment tried: omeprazole without any relief. She occ. Takes tylenol or ibu without relief. She tries to drink fluids. No ETOH use.  Pt reports normal BM's. No change in urine.   Pt is accompanied by her son today.     Review of Systems   Constitutional: Negative for fever and malaise/fatigue.   Respiratory: Negative for cough.    Cardiovascular: Negative for chest pain.   Gastrointestinal: Positive for abdominal pain. Negative for blood in stool, constipation, diarrhea, melena, nausea and vomiting.   Neurological: Negative for dizziness and headaches.   Endo/Heme/Allergies: Does not bruise/bleed easily.   Psychiatric/Behavioral: Negative for substance abuse.       No Known Allergies  History reviewed. No pertinent past medical history.  History reviewed. No pertinent surgical history.  History reviewed. No pertinent family history.  Social History     Tobacco Use   • Smoking status: Never Smoker   Substance Use Topics   • Alcohol use: No     There is no problem list on file for this patient.    No current outpatient medications on file prior to visit.     No current facility-administered medications on file prior to visit.          Objective:     /76   Pulse 74   Temp 37.4 °C (99.3 °F) (Temporal)   Resp 18   Ht 1.6 m (5' 3\")   Wt 63.5 kg (140 lb)   SpO2 98%   BMI 24.80 kg/m²     Physical Exam  Vitals and nursing note reviewed.   Constitutional:       Appearance: Normal appearance. She is normal weight. She is not ill-appearing or toxic-appearing.   HENT:     "  Mouth/Throat:      Mouth: Mucous membranes are moist.   Eyes:      Pupils: Pupils are equal, round, and reactive to light.   Cardiovascular:      Rate and Rhythm: Normal rate and regular rhythm.      Pulses: Normal pulses.   Pulmonary:      Effort: Pulmonary effort is normal.   Abdominal:      General: There is no distension (subjective distension present ).      Palpations: Abdomen is soft. There is no shifting dullness, fluid wave, hepatomegaly or splenomegaly.      Tenderness: There is abdominal tenderness. There is no right CVA tenderness, left CVA tenderness, guarding or rebound. Positive signs include Hsu's sign. Negative signs include Rovsing's sign and McBurney's sign.       Skin:     General: Skin is warm.      Capillary Refill: Capillary refill takes less than 2 seconds.      Coloration: Skin is not jaundiced.      Findings: No bruising.   Neurological:      Mental Status: She is alert and oriented to person, place, and time.   Psychiatric:         Mood and Affect: Mood normal.         Behavior: Behavior normal.         Thought Content: Thought content normal.         Assessment /Associated Orders:      1. RUQ abdominal pain  US-RUQ   2. Abdominal distension         Medical Decision Making:    Pt is clinically stable at today's acute urgent care visit.  No acute distress noted. Appropriate for outpatient management at this time.   US: GB pending   Declines labwork today because she does not have insurance.   Agree's to US test to look at liver and GB.   Diet- avoid greasy or fatty foods   Keep well hydrated  OTC  analgesic of choice (acetaminophen or NSAID). Follow manufactures dosing and safety precautions.     Advised the patient to follow-up with the primary care provider for recheck, reevaluation, and consideration of further management if necessary.   Discussed management options (risks,benefits, and alternatives to treatment). Pt expresses understanding and the treatment plan was agreed upon.  Questions were encouraged and answered to pt's satisfaction.     Return to urgent care prn if new or worsening sx or if there is no improvement in condition prn . Red flags discussed and indications to immediately call 911 or present to the Emergency Department.     I personally reviewed prior external notes and test results pertinent to today's visit.  I have independently reviewed and interpreted all diagnostics ordered during this urgent care acute visit.   Time spent evaluating this patient was at least 30 minutes and includes preparing for visit, counseling/education, exam and evaluation, obtaining history, independent interpretation, ordering lab/test/procedures and medication management.

## 2021-04-03 ENCOUNTER — TELEPHONE (OUTPATIENT)
Dept: URGENT CARE | Facility: PHYSICIAN GROUP | Age: 46
End: 2021-04-03

## 2021-04-03 NOTE — TELEPHONE ENCOUNTER
----- Message from Audra Barrientos APaulPPaulNPaul sent at 4/2/2021  5:05 PM PDT -----  Notify pt of test result by medical  DEREK ROQUE US   Continue low fat, low grease diet.   ER precautions for worsening sx.   FV with PCP.

## 2021-04-04 NOTE — TELEPHONE ENCOUNTER
I attempted to call the patient twice and I was unable to leave a voicemail. I tried both mobile and home phone number.

## 2021-04-06 NOTE — TELEPHONE ENCOUNTER
Yes it is, I had to do it twice because I messed up on the first one so if you see them in letters and see two that's why. Sorry about that

## 2021-04-15 ENCOUNTER — APPOINTMENT (OUTPATIENT)
Dept: RADIOLOGY | Facility: MEDICAL CENTER | Age: 46
End: 2021-04-15
Attending: EMERGENCY MEDICINE
Payer: COMMERCIAL

## 2021-04-15 ENCOUNTER — HOSPITAL ENCOUNTER (EMERGENCY)
Facility: MEDICAL CENTER | Age: 46
End: 2021-04-15
Attending: EMERGENCY MEDICINE
Payer: COMMERCIAL

## 2021-04-15 VITALS
RESPIRATION RATE: 18 BRPM | DIASTOLIC BLOOD PRESSURE: 62 MMHG | TEMPERATURE: 98 F | HEIGHT: 63 IN | BODY MASS INDEX: 25.39 KG/M2 | SYSTOLIC BLOOD PRESSURE: 106 MMHG | OXYGEN SATURATION: 93 % | HEART RATE: 68 BPM | WEIGHT: 143.3 LBS

## 2021-04-15 DIAGNOSIS — K29.00 ACUTE GASTRITIS WITHOUT HEMORRHAGE, UNSPECIFIED GASTRITIS TYPE: ICD-10-CM

## 2021-04-15 LAB
ALBUMIN SERPL BCP-MCNC: 4.1 G/DL (ref 3.2–4.9)
ALBUMIN/GLOB SERPL: 1.4 G/DL
ALP SERPL-CCNC: 46 U/L (ref 30–99)
ALT SERPL-CCNC: 17 U/L (ref 2–50)
ANION GAP SERPL CALC-SCNC: 8 MMOL/L (ref 7–16)
APPEARANCE UR: CLEAR
AST SERPL-CCNC: 27 U/L (ref 12–45)
BACTERIA #/AREA URNS HPF: NEGATIVE /HPF
BASOPHILS # BLD AUTO: 0.4 % (ref 0–1.8)
BASOPHILS # BLD: 0.02 K/UL (ref 0–0.12)
BILIRUB SERPL-MCNC: 0.2 MG/DL (ref 0.1–1.5)
BILIRUB UR QL STRIP.AUTO: NEGATIVE
BUN SERPL-MCNC: 15 MG/DL (ref 8–22)
CALCIUM SERPL-MCNC: 9.3 MG/DL (ref 8.5–10.5)
CHLORIDE SERPL-SCNC: 105 MMOL/L (ref 96–112)
CO2 SERPL-SCNC: 27 MMOL/L (ref 20–33)
COLOR UR: YELLOW
CREAT SERPL-MCNC: 0.64 MG/DL (ref 0.5–1.4)
EOSINOPHIL # BLD AUTO: 0.03 K/UL (ref 0–0.51)
EOSINOPHIL NFR BLD: 0.5 % (ref 0–6.9)
EPI CELLS #/AREA URNS HPF: ABNORMAL /HPF
ERYTHROCYTE [DISTWIDTH] IN BLOOD BY AUTOMATED COUNT: 43.7 FL (ref 35.9–50)
GLOBULIN SER CALC-MCNC: 3 G/DL (ref 1.9–3.5)
GLUCOSE SERPL-MCNC: 89 MG/DL (ref 65–99)
GLUCOSE UR STRIP.AUTO-MCNC: NEGATIVE MG/DL
HCT VFR BLD AUTO: 39 % (ref 37–47)
HGB BLD-MCNC: 12.7 G/DL (ref 12–16)
HYALINE CASTS #/AREA URNS LPF: ABNORMAL /LPF
IMM GRANULOCYTES # BLD AUTO: 0.01 K/UL (ref 0–0.11)
IMM GRANULOCYTES NFR BLD AUTO: 0.2 % (ref 0–0.9)
KETONES UR STRIP.AUTO-MCNC: ABNORMAL MG/DL
LEUKOCYTE ESTERASE UR QL STRIP.AUTO: NEGATIVE
LIPASE SERPL-CCNC: 35 U/L (ref 11–82)
LYMPHOCYTES # BLD AUTO: 1.39 K/UL (ref 1–4.8)
LYMPHOCYTES NFR BLD: 25.1 % (ref 22–41)
MCH RBC QN AUTO: 27.4 PG (ref 27–33)
MCHC RBC AUTO-ENTMCNC: 32.6 G/DL (ref 33.6–35)
MCV RBC AUTO: 84.2 FL (ref 81.4–97.8)
MICRO URNS: ABNORMAL
MONOCYTES # BLD AUTO: 0.55 K/UL (ref 0–0.85)
MONOCYTES NFR BLD AUTO: 9.9 % (ref 0–13.4)
NEUTROPHILS # BLD AUTO: 3.53 K/UL (ref 2–7.15)
NEUTROPHILS NFR BLD: 63.9 % (ref 44–72)
NITRITE UR QL STRIP.AUTO: NEGATIVE
NRBC # BLD AUTO: 0 K/UL
NRBC BLD-RTO: 0 /100 WBC
PH UR STRIP.AUTO: 6 [PH] (ref 5–8)
PLATELET # BLD AUTO: 322 K/UL (ref 164–446)
PMV BLD AUTO: 10.2 FL (ref 9–12.9)
POTASSIUM SERPL-SCNC: 3.8 MMOL/L (ref 3.6–5.5)
PROT SERPL-MCNC: 7.1 G/DL (ref 6–8.2)
PROT UR QL STRIP: NEGATIVE MG/DL
RBC # BLD AUTO: 4.63 M/UL (ref 4.2–5.4)
RBC # URNS HPF: ABNORMAL /HPF
RBC UR QL AUTO: ABNORMAL
SODIUM SERPL-SCNC: 140 MMOL/L (ref 135–145)
SP GR UR STRIP.AUTO: 1.02
UROBILINOGEN UR STRIP.AUTO-MCNC: 0.2 MG/DL
WBC # BLD AUTO: 5.5 K/UL (ref 4.8–10.8)
WBC #/AREA URNS HPF: ABNORMAL /HPF

## 2021-04-15 PROCEDURE — 36415 COLL VENOUS BLD VENIPUNCTURE: CPT

## 2021-04-15 PROCEDURE — A9270 NON-COVERED ITEM OR SERVICE: HCPCS | Performed by: EMERGENCY MEDICINE

## 2021-04-15 PROCEDURE — 81001 URINALYSIS AUTO W/SCOPE: CPT

## 2021-04-15 PROCEDURE — 700102 HCHG RX REV CODE 250 W/ 637 OVERRIDE(OP): Performed by: EMERGENCY MEDICINE

## 2021-04-15 PROCEDURE — 99284 EMERGENCY DEPT VISIT MOD MDM: CPT

## 2021-04-15 PROCEDURE — 80053 COMPREHEN METABOLIC PANEL: CPT

## 2021-04-15 PROCEDURE — 83690 ASSAY OF LIPASE: CPT

## 2021-04-15 PROCEDURE — 700117 HCHG RX CONTRAST REV CODE 255: Performed by: EMERGENCY MEDICINE

## 2021-04-15 PROCEDURE — 74177 CT ABD & PELVIS W/CONTRAST: CPT

## 2021-04-15 PROCEDURE — 85025 COMPLETE CBC W/AUTO DIFF WBC: CPT

## 2021-04-15 RX ORDER — OMEPRAZOLE 20 MG/1
20 CAPSULE, DELAYED RELEASE ORAL DAILY
Qty: 30 CAPSULE | Refills: 11 | Status: SHIPPED | OUTPATIENT
Start: 2021-04-15

## 2021-04-15 RX ORDER — SUCRALFATE ORAL 1 G/10ML
1 SUSPENSION ORAL
Qty: 280 ML | Refills: 0 | Status: SHIPPED | OUTPATIENT
Start: 2021-04-15 | End: 2021-04-22

## 2021-04-15 RX ADMIN — LIDOCAINE HYDROCHLORIDE 30 ML: 20 SOLUTION OROPHARYNGEAL at 15:37

## 2021-04-15 RX ADMIN — IOHEXOL 100 ML: 350 INJECTION, SOLUTION INTRAVENOUS at 16:46

## 2021-04-15 ASSESSMENT — LIFESTYLE VARIABLES: DO YOU DRINK ALCOHOL: NO

## 2021-04-15 NOTE — ED TRIAGE NOTES
.  Chief Complaint   Patient presents with   • Abdominal Pain     mid epigastric pain x 5 days      Pt ambulate to triage with above complaint. Pt reports mid epigastric pain radiating to to her right quadrant. C/O excessive burping, denies N/V, feels like pressure. Last BM this morning, Pt notes diarrhea today.   Pt educated on triage process and returned to lobby.

## 2021-04-15 NOTE — ED NOTES
Pt ambulatory with steady gait to YL56, pt in gown and on gurney, call light in reach, chart up for ERP.

## 2021-04-15 NOTE — ED PROVIDER NOTES
"ED Provider Note    Scribed for Renard Matta M.D. by Frank Sears. 4/15/2021  3:16 PM    Primary care provider: Ryan Cook M.D.  Means of arrival: Walk in  History obtained from: Patient  History limited by: None    CHIEF COMPLAINT  Chief Complaint   Patient presents with    Abdominal Pain     mid epigastric pain x 5 days       HPI  Batool Posey is a 45 y.o. female who presents to the Emergency Department with mild mid-epigastric abdominal pain onset 3 weeks ago. She was seen at urgent care 2 weeks ago for similar symptoms, including bloating and right upper quadrant pain. Per the patient, the pain has persisted since her visit to urgent care, and she decided to come in today as the pain suddenly worsened while she was at work. She reports additional symptoms of diarrhea, and denies nausea or vomiting. Eating exacerbates the pain; no other exacerbating or alleviating factors were noted.     REVIEW OF SYSTEMS  Pertinent positives include abdominal pain, diarrhea. Pertinent negatives include no nausea, vomiting.  All other systems reviewed and negative.    PAST MEDICAL HISTORY   None pertinent    SURGICAL HISTORY  patient denies any surgical history    SOCIAL HISTORY  Social History     Tobacco Use    Smoking status: Never Smoker    Smokeless tobacco: Never Used   Substance Use Topics    Alcohol use: No    Drug use: No      Social History     Substance and Sexual Activity   Drug Use No       FAMILY HISTORY  History reviewed. No pertinent family history.    CURRENT MEDICATIONS  Home Medications       Reviewed by Kylee Green R.N. (Registered Nurse) on 04/15/21 at 1228  Med List Status: Complete     Medication Last Dose Status        Patient Ruben Taking any Medications                           ALLERGIES  No Known Allergies    PHYSICAL EXAM  VITAL SIGNS: /60   Pulse 68   Temp 36.5 °C (97.7 °F) (Temporal)   Resp 18   Ht 1.6 m (5' 3\")   Wt 65 kg (143 lb 4.8 oz)   LMP 04/13/2021 " (Exact Date)   SpO2 98%   BMI 25.38 kg/m²     Constitutional: Well developed, Well nourished, Mild distress, Non-toxic appearance.   HENT: Normocephalic, Atraumatic, Bilateral external ears normal, Oropharynx moist, No oral exudates.   Eyes: PERRLA, EOMI, Conjunctiva normal, No discharge.   Neck: No tenderness, Supple, No stridor.   Lymphatic: No lymphadenopathy noted.   Cardiovascular: Normal heart rate, Normal rhythm.   Thorax & Lungs: Clear to auscultation bilaterally, No respiratory distress, No wheezing, No crackles.   Abdomen: Soft, Tenderness to palpation across the upper abdomen, more pronounced in the epigastric region and right upper quadrant, No masses, No pulsatile masses.   Skin: Warm, Dry, No erythema, No rash.   Extremities:, No edema No cyanosis.   Musculoskeletal: No tenderness to palpation or major deformities noted.  Intact distal pulses  Neurologic: Awake, alert. Moves all extremities spontaneously.  Psychiatric: Affect normal, Judgment normal, Mood normal.       LABS  Results for orders placed or performed during the hospital encounter of 04/15/21   CBC WITH DIFFERENTIAL   Result Value Ref Range    WBC 5.5 4.8 - 10.8 K/uL    RBC 4.63 4.20 - 5.40 M/uL    Hemoglobin 12.7 12.0 - 16.0 g/dL    Hematocrit 39.0 37.0 - 47.0 %    MCV 84.2 81.4 - 97.8 fL    MCH 27.4 27.0 - 33.0 pg    MCHC 32.6 (L) 33.6 - 35.0 g/dL    RDW 43.7 35.9 - 50.0 fL    Platelet Count 322 164 - 446 K/uL    MPV 10.2 9.0 - 12.9 fL    Neutrophils-Polys 63.90 44.00 - 72.00 %    Lymphocytes 25.10 22.00 - 41.00 %    Monocytes 9.90 0.00 - 13.40 %    Eosinophils 0.50 0.00 - 6.90 %    Basophils 0.40 0.00 - 1.80 %    Immature Granulocytes 0.20 0.00 - 0.90 %    Nucleated RBC 0.00 /100 WBC    Neutrophils (Absolute) 3.53 2.00 - 7.15 K/uL    Lymphs (Absolute) 1.39 1.00 - 4.80 K/uL    Monos (Absolute) 0.55 0.00 - 0.85 K/uL    Eos (Absolute) 0.03 0.00 - 0.51 K/uL    Baso (Absolute) 0.02 0.00 - 0.12 K/uL    Immature Granulocytes (abs) 0.01 0.00 -  0.11 K/uL    NRBC (Absolute) 0.00 K/uL   COMP METABOLIC PANEL   Result Value Ref Range    Sodium 140 135 - 145 mmol/L    Potassium 3.8 3.6 - 5.5 mmol/L    Chloride 105 96 - 112 mmol/L    Co2 27 20 - 33 mmol/L    Anion Gap 8.0 7.0 - 16.0    Glucose 89 65 - 99 mg/dL    Bun 15 8 - 22 mg/dL    Creatinine 0.64 0.50 - 1.40 mg/dL    Calcium 9.3 8.5 - 10.5 mg/dL    AST(SGOT) 27 12 - 45 U/L    ALT(SGPT) 17 2 - 50 U/L    Alkaline Phosphatase 46 30 - 99 U/L    Total Bilirubin 0.2 0.1 - 1.5 mg/dL    Albumin 4.1 3.2 - 4.9 g/dL    Total Protein 7.1 6.0 - 8.2 g/dL    Globulin 3.0 1.9 - 3.5 g/dL    A-G Ratio 1.4 g/dL   LIPASE   Result Value Ref Range    Lipase 35 11 - 82 U/L   URINALYSIS    Specimen: Urine, Clean Catch   Result Value Ref Range    Color Yellow     Character Clear     Specific Gravity 1.021 <1.035    Ph 6.0 5.0 - 8.0    Glucose Negative Negative mg/dL    Ketones Trace (A) Negative mg/dL    Protein Negative Negative mg/dL    Bilirubin Negative Negative    Urobilinogen, Urine 0.2 Negative    Nitrite Negative Negative    Leukocyte Esterase Negative Negative    Occult Blood Moderate (A) Negative    Micro Urine Req Microscopic    ESTIMATED GFR   Result Value Ref Range    GFR If African American >60 >60 mL/min/1.73 m 2    GFR If Non African American >60 >60 mL/min/1.73 m 2   URINE MICROSCOPIC (W/UA)   Result Value Ref Range    WBC 0-2 /hpf    RBC 10-20 (A) /hpf    Bacteria Negative None /hpf    Epithelial Cells Few /hpf    Hyaline Cast 0-2 /lpf        RADIOLOGY  CT-ABDOMEN-PELVIS WITH   Final Result      1.  Negative contrast-enhanced CT of the abdomen and pelvis.      2.  Normal appearance to the appendix in the right lower quadrant.      3.  Small amount of free fluid in the pelvis which may be physiologic.        The radiologist's interpretation of all radiological studies have been reviewed by me.      COURSE & MEDICAL DECISION MAKING  Pertinent Labs & Imaging studies reviewed. (See chart for details)    I reviewed the  patient's medical records which showed she was evaluated at urgent care 2 weeks ago for similar symptoms, including bloating and RUQ pain.     3:16 PM - Patient seen and examined at bedside. Discussed the results of the lab work and ordering a CT scan to evaluate further. Patient verbalizes understanding and agreement to this plan of care.  Patient will be treated with GI cocktail 30 mL. Ordered CT-abdomen-pelvis w/, Urine microscopic, Estimated GFR, CBC w/diff, CMP, Lipase, and UA to evaluate her symptoms.    5:48 PM Recheck: Patient re-evaluated at bedside. On repeat exam, patient's abdomen is soft and nontender. Discussed patient's condition and treatment plan. Patient's lab and radiology results discussed. Gave discharge instructions and return precautions. The patient understood and is in agreement.     Decision Making:  Epigastric abdominal pain about I elected to get a CT scan, CT scan was unremarkable.  The patient is feeling much improved after GI cocktail, will discharge the patient home on omeprazole, Carafate, have the patient return with worsening symptoms, will have the patient follow-up with GI as an outpatient.  Have the patient return in the next 1 to 2 days if not improved, return sooner with worsening symptoms.    The patient will return for new or worsening symptoms and is stable at the time of discharge.    The patient is referred to a primary physician for blood pressure management, diabetic screening, and for all other preventative health concerns.      DISPOSITION:  Patient will be discharged home in stable condition.    FOLLOW UP:  [facility], Emergency Dept  [address] [address]  [phone]    If symptoms worsen    [facility]  [address] [facility] [id number] [phone]        OUTPATIENT MEDICATIONS:  Discharge Medication List as of 4/15/2021  6:04 PM        START taking these medications     Details   omeprazole (PRILOSEC) 20 MG delayed-release capsule Take 1 capsule by mouth every day., Disp-30 capsule, R-11, Normal      sucralfate (CARAFATE) 1 GM/10ML Suspension Take 10 mL by mouth 4 Times a Day,Before Meals and at Bedtime for 7 days., Disp-280 mL, R-0, Normal             FINAL IMPRESSION  1. Acute gastritis without hemorrhage, unspecified gastritis type          I, Frank Sears (Scribtacho), am scribing for, and in the presence of, Renard Matta M.D..    Electronically signed by: Frank Sears (Scribe), 4/15/2021    I, Renard Matta M.D. personally performed the services described in this documentation, as scribed by Frank Sears in my presence, and it is both accurate and complete.    C    The note accurately reflects work and decisions made by me.  Renard Matta M.D.  4/15/2021  7:50 PM

## 2021-04-16 NOTE — ED NOTES
Pt given d/c instructions, including follow up care with GI and Rx information. Questions addressed. PIV removed. Pt ambulated independently out of ER with visitor. Pt stable upon d/c.

## 2021-04-16 NOTE — ED NOTES
"Pt discharged home via ambulatory to lobby with steady gait, AOx4, family accompanying. IV discontinued and gauze placed, pt in possession of belongings. Pt provided discharge education and information pertaining to medications and follow up appointments. Pt received copy of discharge instructions and verbalized understanding.     /62   Pulse 68   Temp 36.7 °C (98 °F) (Temporal)   Resp 18   Ht 1.6 m (5' 3\")   Wt 65 kg (143 lb 4.8 oz)   LMP 04/13/2021 (Exact Date)   SpO2 93%   BMI 25.38 kg/m²   "